# Patient Record
Sex: FEMALE | Race: WHITE | NOT HISPANIC OR LATINO | Employment: FULL TIME | URBAN - METROPOLITAN AREA
[De-identification: names, ages, dates, MRNs, and addresses within clinical notes are randomized per-mention and may not be internally consistent; named-entity substitution may affect disease eponyms.]

---

## 2023-03-15 LAB
THYROTROPIN (MIU/L) IN SER/PLAS BY DETECTION LIMIT <= 0.05 MIU/L: 4.66 MIU/L (ref 0.44–3.98)
THYROXINE (T4) FREE (NG/DL) IN SER/PLAS: 0.83 NG/DL (ref 0.78–1.48)
TRIIODOTHYRONINE (T3) FREE (PG/ML) IN SER/PLAS: 3.5 PG/ML (ref 2.3–4.2)

## 2023-07-18 LAB
THYROTROPIN (MIU/L) IN SER/PLAS BY DETECTION LIMIT <= 0.05 MIU/L: 0.03 MIU/L (ref 0.44–3.98)
THYROXINE (T4) FREE (NG/DL) IN SER/PLAS: 2.01 NG/DL (ref 0.78–1.48)
TRIIODOTHYRONINE (T3) FREE (PG/ML) IN SER/PLAS: 4.2 PG/ML (ref 2.3–4.2)

## 2023-10-03 ENCOUNTER — LAB (OUTPATIENT)
Dept: LAB | Facility: LAB | Age: 63
End: 2023-10-03
Payer: COMMERCIAL

## 2023-10-03 DIAGNOSIS — E03.9 HYPOTHYROIDISM, UNSPECIFIED: ICD-10-CM

## 2023-10-03 DIAGNOSIS — E03.9 HYPOTHYROIDISM, UNSPECIFIED TYPE: Primary | ICD-10-CM

## 2023-10-03 DIAGNOSIS — E03.9 HYPOTHYROIDISM, UNSPECIFIED: Primary | ICD-10-CM

## 2023-10-03 LAB — TSH SERPL-ACNC: 0.24 MIU/L (ref 0.44–3.98)

## 2023-10-03 PROCEDURE — 36415 COLL VENOUS BLD VENIPUNCTURE: CPT

## 2023-10-03 RX ORDER — LEVOTHYROXINE SODIUM 137 UG/1
137 TABLET ORAL DAILY
Qty: 90 TABLET | Refills: 3 | Status: SHIPPED | OUTPATIENT
Start: 2023-10-03 | End: 2024-10-02

## 2023-10-04 LAB — T4 FREE SERPL-MCNC: 1.69 NG/DL (ref 0.78–1.48)

## 2023-11-20 ENCOUNTER — TELEPHONE (OUTPATIENT)
Dept: OBSTETRICS AND GYNECOLOGY | Facility: CLINIC | Age: 63
End: 2023-11-20
Payer: COMMERCIAL

## 2023-11-20 DIAGNOSIS — N95.2 VAGINAL ATROPHY: Primary | ICD-10-CM

## 2023-11-20 RX ORDER — PRASTERONE 6.5 MG/1
6.5 INSERT VAGINAL NIGHTLY
Qty: 28 EACH | Refills: 11 | Status: SHIPPED | OUTPATIENT
Start: 2023-11-20 | End: 2024-03-22 | Stop reason: SDUPTHER

## 2023-11-20 NOTE — TELEPHONE ENCOUNTER
Left message for pt to return call.  Rx refill sent to [facility].  Pt due for annual last appt was 11/2022.

## 2023-12-20 ENCOUNTER — APPOINTMENT (OUTPATIENT)
Dept: PRIMARY CARE | Facility: CLINIC | Age: 63
End: 2023-12-20
Payer: COMMERCIAL

## 2024-01-16 ENCOUNTER — ANCILLARY PROCEDURE (OUTPATIENT)
Dept: RADIOLOGY | Facility: CLINIC | Age: 64
End: 2024-01-16
Payer: COMMERCIAL

## 2024-01-16 ENCOUNTER — OFFICE VISIT (OUTPATIENT)
Dept: PRIMARY CARE | Facility: CLINIC | Age: 64
End: 2024-01-16
Payer: COMMERCIAL

## 2024-01-16 ENCOUNTER — LAB (OUTPATIENT)
Dept: LAB | Facility: LAB | Age: 64
End: 2024-01-16
Payer: COMMERCIAL

## 2024-01-16 VITALS
WEIGHT: 154.6 LBS | HEIGHT: 59 IN | SYSTOLIC BLOOD PRESSURE: 132 MMHG | HEART RATE: 77 BPM | BODY MASS INDEX: 31.17 KG/M2 | DIASTOLIC BLOOD PRESSURE: 67 MMHG

## 2024-01-16 DIAGNOSIS — Z12.31 ENCOUNTER FOR SCREENING MAMMOGRAM FOR MALIGNANT NEOPLASM OF BREAST: ICD-10-CM

## 2024-01-16 DIAGNOSIS — Z23 NEED FOR PNEUMOCOCCAL 20-VALENT CONJUGATE VACCINATION: ICD-10-CM

## 2024-01-16 DIAGNOSIS — Z00.00 ROUTINE GENERAL MEDICAL EXAMINATION AT A HEALTH CARE FACILITY: Primary | ICD-10-CM

## 2024-01-16 DIAGNOSIS — M53.3 SI (SACROILIAC) JOINT DYSFUNCTION: ICD-10-CM

## 2024-01-16 DIAGNOSIS — M75.21 BICEPS TENDINITIS OF RIGHT UPPER EXTREMITY: ICD-10-CM

## 2024-01-16 DIAGNOSIS — Z00.00 ROUTINE GENERAL MEDICAL EXAMINATION AT A HEALTH CARE FACILITY: ICD-10-CM

## 2024-01-16 DIAGNOSIS — E03.9 HYPOTHYROIDISM, UNSPECIFIED TYPE: ICD-10-CM

## 2024-01-16 DIAGNOSIS — Z78.0 POSTMENOPAUSAL ESTROGEN DEFICIENCY: ICD-10-CM

## 2024-01-16 DIAGNOSIS — Z23 NEED FOR INFLUENZA VACCINATION: ICD-10-CM

## 2024-01-16 PROBLEM — H25.13 NUCLEAR SCLEROSIS OF BOTH EYES: Status: ACTIVE | Noted: 2024-01-16

## 2024-01-16 PROBLEM — K58.9 IRRITABLE BOWEL SYNDROME: Status: ACTIVE | Noted: 2024-01-16

## 2024-01-16 PROBLEM — M15.9 OSTEOARTHRITIS, MULTIPLE SITES: Status: ACTIVE | Noted: 2024-01-16

## 2024-01-16 PROBLEM — M81.0 AGE RELATED OSTEOPOROSIS: Status: ACTIVE | Noted: 2024-01-16

## 2024-01-16 PROBLEM — G43.109 OPHTHALMIC MIGRAINE: Status: ACTIVE | Noted: 2024-01-16

## 2024-01-16 PROBLEM — R76.0 ABNORMAL ANTIBODY TITER: Status: ACTIVE | Noted: 2024-01-16

## 2024-01-16 PROBLEM — M80.00XA OSTEOPOROSIS WITH CURRENT PATHOLOGICAL FRACTURE: Status: RESOLVED | Noted: 2024-01-16 | Resolved: 2024-01-16

## 2024-01-16 PROBLEM — H52.4 BILATERAL PRESBYOPIA: Status: ACTIVE | Noted: 2024-01-16

## 2024-01-16 PROBLEM — H26.9 CATARACTS, BOTH EYES: Status: ACTIVE | Noted: 2024-01-16

## 2024-01-16 PROBLEM — H52.13 BILATERAL MYOPIA: Status: ACTIVE | Noted: 2024-01-16

## 2024-01-16 PROBLEM — H43.813 PVD (POSTERIOR VITREOUS DETACHMENT), BOTH EYES: Status: ACTIVE | Noted: 2024-01-16

## 2024-01-16 PROBLEM — E55.9 VITAMIN D DEFICIENCY: Status: ACTIVE | Noted: 2024-01-16

## 2024-01-16 PROBLEM — E66.9 OBESITY: Status: ACTIVE | Noted: 2024-01-16

## 2024-01-16 PROBLEM — H52.7 REFRACTIVE ERROR: Status: ACTIVE | Noted: 2024-01-16

## 2024-01-16 LAB
25(OH)D3 SERPL-MCNC: 65 NG/ML (ref 30–100)
ALBUMIN SERPL BCP-MCNC: 4.2 G/DL (ref 3.4–5)
ALP SERPL-CCNC: 106 U/L (ref 33–136)
ALT SERPL W P-5'-P-CCNC: 12 U/L (ref 7–45)
ANION GAP SERPL CALC-SCNC: 14 MMOL/L (ref 10–20)
AST SERPL W P-5'-P-CCNC: 14 U/L (ref 9–39)
BASOPHILS # BLD AUTO: 0.06 X10*3/UL (ref 0–0.1)
BASOPHILS NFR BLD AUTO: 0.7 %
BILIRUB SERPL-MCNC: 0.4 MG/DL (ref 0–1.2)
BUN SERPL-MCNC: 19 MG/DL (ref 6–23)
CALCIUM SERPL-MCNC: 9.9 MG/DL (ref 8.6–10.6)
CHLORIDE SERPL-SCNC: 107 MMOL/L (ref 98–107)
CHOLEST SERPL-MCNC: 162 MG/DL (ref 0–199)
CHOLESTEROL/HDL RATIO: 2.8
CK SERPL-CCNC: 51 U/L (ref 0–215)
CO2 SERPL-SCNC: 23 MMOL/L (ref 21–32)
CREAT SERPL-MCNC: 0.63 MG/DL (ref 0.5–1.05)
CREAT UR-MCNC: 88.6 MG/DL (ref 20–320)
EGFRCR SERPLBLD CKD-EPI 2021: >90 ML/MIN/1.73M*2
EOSINOPHIL # BLD AUTO: 0.09 X10*3/UL (ref 0–0.7)
EOSINOPHIL NFR BLD AUTO: 1.1 %
ERYTHROCYTE [DISTWIDTH] IN BLOOD BY AUTOMATED COUNT: 13.1 % (ref 11.5–14.5)
ERYTHROCYTE [SEDIMENTATION RATE] IN BLOOD BY WESTERGREN METHOD: 8 MM/H (ref 0–30)
EST. AVERAGE GLUCOSE BLD GHB EST-MCNC: 114 MG/DL
GLUCOSE SERPL-MCNC: 97 MG/DL (ref 74–99)
HBA1C MFR BLD: 5.6 %
HCT VFR BLD AUTO: 41.9 % (ref 36–46)
HDLC SERPL-MCNC: 58.2 MG/DL
HGB BLD-MCNC: 13.8 G/DL (ref 12–16)
IMM GRANULOCYTES # BLD AUTO: 0.01 X10*3/UL (ref 0–0.7)
IMM GRANULOCYTES NFR BLD AUTO: 0.1 % (ref 0–0.9)
IRON SATN MFR SERPL: 40 % (ref 25–45)
IRON SERPL-MCNC: 151 UG/DL (ref 35–150)
LDLC SERPL CALC-MCNC: 90 MG/DL
LYMPHOCYTES # BLD AUTO: 2.71 X10*3/UL (ref 1.2–4.8)
LYMPHOCYTES NFR BLD AUTO: 32 %
MCH RBC QN AUTO: 28.7 PG (ref 26–34)
MCHC RBC AUTO-ENTMCNC: 32.9 G/DL (ref 32–36)
MCV RBC AUTO: 87 FL (ref 80–100)
MICROALBUMIN UR-MCNC: 26.3 MG/L
MICROALBUMIN/CREAT UR: 29.7 UG/MG CREAT
MONOCYTES # BLD AUTO: 0.39 X10*3/UL (ref 0.1–1)
MONOCYTES NFR BLD AUTO: 4.6 %
NEUTROPHILS # BLD AUTO: 5.21 X10*3/UL (ref 1.2–7.7)
NEUTROPHILS NFR BLD AUTO: 61.5 %
NON HDL CHOLESTEROL: 104 MG/DL (ref 0–149)
NRBC BLD-RTO: 0 /100 WBCS (ref 0–0)
PLATELET # BLD AUTO: 277 X10*3/UL (ref 150–450)
POTASSIUM SERPL-SCNC: 4.2 MMOL/L (ref 3.5–5.3)
PROT SERPL-MCNC: 6.8 G/DL (ref 6.4–8.2)
RBC # BLD AUTO: 4.81 X10*6/UL (ref 4–5.2)
SODIUM SERPL-SCNC: 140 MMOL/L (ref 136–145)
TIBC SERPL-MCNC: 378 UG/DL (ref 240–445)
TRIGL SERPL-MCNC: 69 MG/DL (ref 0–149)
TSH SERPL-ACNC: 0.58 MIU/L (ref 0.44–3.98)
UIBC SERPL-MCNC: 227 UG/DL (ref 110–370)
URATE SERPL-MCNC: 4.5 MG/DL (ref 2.3–6.7)
VIT B12 SERPL-MCNC: 339 PG/ML (ref 211–911)
VLDL: 14 MG/DL (ref 0–40)
WBC # BLD AUTO: 8.5 X10*3/UL (ref 4.4–11.3)

## 2024-01-16 PROCEDURE — 82570 ASSAY OF URINE CREATININE: CPT

## 2024-01-16 PROCEDURE — 1126F AMNT PAIN NOTED NONE PRSNT: CPT | Performed by: INTERNAL MEDICINE

## 2024-01-16 PROCEDURE — 73502 X-RAY EXAM HIP UNI 2-3 VIEWS: CPT | Mod: LEFT SIDE | Performed by: RADIOLOGY

## 2024-01-16 PROCEDURE — 1036F TOBACCO NON-USER: CPT | Performed by: INTERNAL MEDICINE

## 2024-01-16 PROCEDURE — 84443 ASSAY THYROID STIM HORMONE: CPT

## 2024-01-16 PROCEDURE — 90472 IMMUNIZATION ADMIN EACH ADD: CPT | Performed by: INTERNAL MEDICINE

## 2024-01-16 PROCEDURE — 80061 LIPID PANEL: CPT

## 2024-01-16 PROCEDURE — 90677 PCV20 VACCINE IM: CPT | Performed by: INTERNAL MEDICINE

## 2024-01-16 PROCEDURE — 73030 X-RAY EXAM OF SHOULDER: CPT | Mod: RIGHT SIDE | Performed by: RADIOLOGY

## 2024-01-16 PROCEDURE — 82607 VITAMIN B-12: CPT

## 2024-01-16 PROCEDURE — 83540 ASSAY OF IRON: CPT

## 2024-01-16 PROCEDURE — 85652 RBC SED RATE AUTOMATED: CPT

## 2024-01-16 PROCEDURE — 84550 ASSAY OF BLOOD/URIC ACID: CPT

## 2024-01-16 PROCEDURE — 73502 X-RAY EXAM HIP UNI 2-3 VIEWS: CPT | Mod: LT

## 2024-01-16 PROCEDURE — 36415 COLL VENOUS BLD VENIPUNCTURE: CPT

## 2024-01-16 PROCEDURE — 90662 IIV NO PRSV INCREASED AG IM: CPT | Performed by: INTERNAL MEDICINE

## 2024-01-16 PROCEDURE — 85025 COMPLETE CBC W/AUTO DIFF WBC: CPT

## 2024-01-16 PROCEDURE — 93000 ELECTROCARDIOGRAM COMPLETE: CPT | Performed by: INTERNAL MEDICINE

## 2024-01-16 PROCEDURE — 83550 IRON BINDING TEST: CPT

## 2024-01-16 PROCEDURE — 82306 VITAMIN D 25 HYDROXY: CPT

## 2024-01-16 PROCEDURE — 90471 IMMUNIZATION ADMIN: CPT | Performed by: INTERNAL MEDICINE

## 2024-01-16 PROCEDURE — 82550 ASSAY OF CK (CPK): CPT

## 2024-01-16 PROCEDURE — 82043 UR ALBUMIN QUANTITATIVE: CPT

## 2024-01-16 PROCEDURE — 99397 PER PM REEVAL EST PAT 65+ YR: CPT | Performed by: INTERNAL MEDICINE

## 2024-01-16 PROCEDURE — 83036 HEMOGLOBIN GLYCOSYLATED A1C: CPT

## 2024-01-16 PROCEDURE — 80053 COMPREHEN METABOLIC PANEL: CPT

## 2024-01-16 PROCEDURE — 1159F MED LIST DOCD IN RCRD: CPT | Performed by: INTERNAL MEDICINE

## 2024-01-16 PROCEDURE — 73030 X-RAY EXAM OF SHOULDER: CPT | Mod: RT

## 2024-01-16 RX ORDER — ACETAMINOPHEN 500 MG
1 TABLET ORAL DAILY
COMMUNITY

## 2024-01-16 RX ORDER — MECLIZINE HYDROCHLORIDE 25 MG/1
1 TABLET ORAL 3 TIMES DAILY PRN
COMMUNITY
Start: 2022-06-16

## 2024-01-16 RX ORDER — LYSINE HCL 500 MG
TABLET ORAL
COMMUNITY
Start: 2014-08-18

## 2024-01-16 ASSESSMENT — ENCOUNTER SYMPTOMS
FEVER: 0
MYALGIAS: 1
EYE DISCHARGE: 0
HEMATURIA: 0
WHEEZING: 0
PALPITATIONS: 0
FREQUENCY: 0
ADENOPATHY: 0
NECK PAIN: 0
BRUISES/BLEEDS EASILY: 0
FATIGUE: 0
SINUS PAIN: 0
NAUSEA: 0
EYE ITCHING: 0
WEAKNESS: 0
HYPERACTIVE: 0
DIFFICULTY URINATING: 0
ABDOMINAL DISTENTION: 0
ABDOMINAL PAIN: 0
DYSPHORIC MOOD: 0
SORE THROAT: 0
SHORTNESS OF BREATH: 0
CONSTIPATION: 0
RHINORRHEA: 0
DIZZINESS: 0
VOICE CHANGE: 0
SINUS PRESSURE: 0
NECK STIFFNESS: 0
HEADACHES: 0
CHEST TIGHTNESS: 0
SLEEP DISTURBANCE: 0
APNEA: 0
COLOR CHANGE: 0
NERVOUS/ANXIOUS: 0
VOMITING: 0
DYSURIA: 0
CHILLS: 0
COUGH: 0
NUMBNESS: 0
LIGHT-HEADEDNESS: 0
ACTIVITY CHANGE: 0
DIARRHEA: 0
ARTHRALGIAS: 1
BACK PAIN: 0
DECREASED CONCENTRATION: 0

## 2024-01-16 NOTE — PATIENT INSTRUCTIONS
We will follow up on all comprehensive blood work once results are available and make any recommendations based on these results as may be indicated.  Please continue with routine gynecologic exams and annual mammograms.  Please consider also scheduling your bone density scan.  Colonoscopy will be due next year.  You have received your annual flu shot as well as your Prevnar 20 pneumonia vaccine.  Please consider a shingles vaccine series, updated tetanus booster, RSV vaccine and COVID boosters.  We will proceed with x-rays of the right shoulder and the left hip/pelvis.  I would recommend over-the-counter Aleve twice daily with food as well as stretching exercises.  I would ice the shoulder as well as alternate heat and ice to the lower back.  If you have persistent symptoms, we could consider physical therapy.  Please keep us updated on how you are responding to conservative treatment, please reach out with any questions or concerns.  Otherwise, we are happy to see you annually for your wellness visits.

## 2024-01-16 NOTE — PROGRESS NOTES
Thais Huff comes in today for a comprehensive physical exam.      Ms. Huff comes in today for a comprehensive physical exam.  She is feeling reasonably well.  She has had some generalized aching, most prominent in her right arm/shoulder as well as her left leg.  She has not had any specific injuries, uncertain whether this could be related to arthritis or soft tissue.  She finds that her right shoulder bothers her when she tries to reach overhead.  Left leg is achy from her thigh all the way down to her lower leg, no swelling.  There is no localized hip pain or lower back pain that she notices.  This is most prominent when she gets up from a seated position but not necessarily through the day or night.  She follows with her endocrinologist, continuing to make dose adjustments in her thyroid with upcoming labs and appointment.  She is comfortable having fasting blood work done today.  She denies any headaches, dizziness, chest pain or palpitations, shortness of breath nor cough, nausea, vomiting, nor acute change in bowel or bladder habits.  She keeps up with fiber supplements.  She follows with gynecology regularly.        Review of Systems   Constitutional:  Negative for activity change, chills, fatigue and fever.   HENT:  Positive for congestion. Negative for ear discharge, ear pain, hearing loss, postnasal drip, rhinorrhea, sinus pressure, sinus pain, sneezing, sore throat, tinnitus and voice change.    Eyes:  Negative for discharge, itching and visual disturbance.   Respiratory:  Negative for apnea, cough, chest tightness, shortness of breath and wheezing.    Cardiovascular:  Negative for chest pain, palpitations and leg swelling.   Gastrointestinal:  Negative for abdominal distention, abdominal pain, constipation, diarrhea, nausea and vomiting.   Endocrine: Negative for cold intolerance, heat intolerance and polyuria.   Genitourinary:  Negative for difficulty urinating, dysuria, frequency, hematuria,  pelvic pain, urgency, vaginal bleeding and vaginal discharge.   Musculoskeletal:  Positive for arthralgias and myalgias. Negative for back pain, gait problem, neck pain and neck stiffness.   Skin:  Negative for color change, pallor and rash.   Allergic/Immunologic: Negative for environmental allergies, food allergies and immunocompromised state.   Neurological:  Negative for dizziness, syncope, weakness, light-headedness, numbness and headaches.   Hematological:  Negative for adenopathy. Does not bruise/bleed easily.   Psychiatric/Behavioral:  Negative for behavioral problems, decreased concentration, dysphoric mood and sleep disturbance. The patient is not nervous/anxious and is not hyperactive.        Objective   Physical Exam  Constitutional:       General: She is not in acute distress.     Appearance: Normal appearance. She is well-developed. She is not ill-appearing.   HENT:      Head: Normocephalic.      Right Ear: Tympanic membrane, ear canal and external ear normal.      Left Ear: Tympanic membrane, ear canal and external ear normal.      Nose: Nose normal. No congestion.      Mouth/Throat:      Mouth: Mucous membranes are moist.      Pharynx: Oropharynx is clear. No oropharyngeal exudate or posterior oropharyngeal erythema.   Eyes:      General: Lids are normal. No scleral icterus.     Extraocular Movements: Extraocular movements intact.      Conjunctiva/sclera: Conjunctivae normal.      Pupils: Pupils are equal, round, and reactive to light.   Neck:      Vascular: No carotid bruit.   Cardiovascular:      Rate and Rhythm: Normal rate and regular rhythm.      Pulses: Normal pulses.      Heart sounds: No murmur heard.     No gallop.   Pulmonary:      Effort: Pulmonary effort is normal. No respiratory distress.      Breath sounds: No wheezing, rhonchi or rales.   Chest:      Comments: Deferred to gynecology  Abdominal:      General: Bowel sounds are normal. There is no distension.      Palpations: Abdomen is  soft. There is no mass.      Tenderness: There is no abdominal tenderness. There is no guarding or rebound.   Genitourinary:     Comments: Deferred to gynecology  Musculoskeletal:         General: Tenderness present. No swelling or signs of injury.      Cervical back: Normal range of motion and neck supple. No tenderness.      Right lower leg: No edema.      Left lower leg: No edema.      Comments: Some reproducible pain on palpation over the bicep tendon insertion with abduction triggering discomfort.  Left-sided SI joint spasm, straight leg raise negative.  Reflexes symmetrical.  No reproducible pain on palpation over the hip joint.  Range of motion full.   Lymphadenopathy:      Cervical: No cervical adenopathy.   Skin:     General: Skin is warm and dry.      Coloration: Skin is not pale.      Findings: No bruising or rash.   Neurological:      General: No focal deficit present.      Mental Status: She is alert and oriented to person, place, and time.      Cranial Nerves: No cranial nerve deficit.      Motor: No weakness.      Gait: Gait normal.      Deep Tendon Reflexes: Reflexes normal.   Psychiatric:         Mood and Affect: Mood normal.         Behavior: Behavior normal.         Judgment: Judgment normal.         Assessment/Plan   Full age-appropriate comprehensive physical exam and health care maintenance performed and discussed today.  Routine safety and preventative measures discussed including self breast exam, seatbelt use, no drinking and driving, no texting and driving, abstinence or cessation of tobacco use, routine dental and vision exams, healthy diet and regular exercise.    We will update comprehensive labs and follow-up on results once available.  Due for mammogram and DEXA.  Both requisitions placed.  Colonoscopy remains up-to-date from 2015, recommended to be repeated in 10 years total, hence due next year.  EKG normal as above.  Annual flu shot and Prevnar 20 provided today.  Declines COVID  boosters.  Have also counseled regarding shingles vaccine, RSV vaccine, and tetanus vaccine recommendations.    We will proceed with x-rays of the right shoulder and left hip/pelvis, but I suspect that this is more SI joint related as well as possible bicep tendinitis.  Have recommended heat alternating with ice to the SI joint, ice to the shoulder girdle.  Have provided stretching exercises and have recommended NSAIDs.  If symptoms persist, could consider physical therapy.    If all remains well otherwise, we are happy to see her back annually for her wellness visits.  She should contact us with any additional questions or concerns.  Problem List Items Addressed This Visit    None

## 2024-01-25 ENCOUNTER — APPOINTMENT (OUTPATIENT)
Dept: ENDOCRINOLOGY | Facility: CLINIC | Age: 64
End: 2024-01-25
Payer: COMMERCIAL

## 2024-03-14 ENCOUNTER — HOSPITAL ENCOUNTER (OUTPATIENT)
Dept: RADIOLOGY | Facility: CLINIC | Age: 64
Discharge: HOME | End: 2024-03-14
Payer: COMMERCIAL

## 2024-03-14 VITALS — HEIGHT: 59 IN | BODY MASS INDEX: 30.24 KG/M2 | WEIGHT: 150 LBS

## 2024-03-14 DIAGNOSIS — Z78.0 POSTMENOPAUSAL ESTROGEN DEFICIENCY: ICD-10-CM

## 2024-03-14 DIAGNOSIS — Z12.31 ENCOUNTER FOR SCREENING MAMMOGRAM FOR MALIGNANT NEOPLASM OF BREAST: ICD-10-CM

## 2024-03-14 PROCEDURE — 77067 SCR MAMMO BI INCL CAD: CPT | Performed by: RADIOLOGY

## 2024-03-14 PROCEDURE — 77063 BREAST TOMOSYNTHESIS BI: CPT | Performed by: RADIOLOGY

## 2024-03-14 PROCEDURE — 77067 SCR MAMMO BI INCL CAD: CPT

## 2024-03-14 PROCEDURE — 77080 DXA BONE DENSITY AXIAL: CPT

## 2024-03-22 ENCOUNTER — OFFICE VISIT (OUTPATIENT)
Dept: OBSTETRICS AND GYNECOLOGY | Facility: CLINIC | Age: 64
End: 2024-03-22
Payer: COMMERCIAL

## 2024-03-22 VITALS
DIASTOLIC BLOOD PRESSURE: 80 MMHG | HEIGHT: 59 IN | WEIGHT: 155.2 LBS | SYSTOLIC BLOOD PRESSURE: 130 MMHG | BODY MASS INDEX: 31.29 KG/M2

## 2024-03-22 DIAGNOSIS — Z01.419 WELL WOMAN EXAM WITH ROUTINE GYNECOLOGICAL EXAM: Primary | ICD-10-CM

## 2024-03-22 DIAGNOSIS — N95.2 VAGINAL ATROPHY: ICD-10-CM

## 2024-03-22 PROCEDURE — 99397 PER PM REEVAL EST PAT 65+ YR: CPT | Performed by: NURSE PRACTITIONER

## 2024-03-22 PROCEDURE — 1126F AMNT PAIN NOTED NONE PRSNT: CPT | Performed by: NURSE PRACTITIONER

## 2024-03-22 PROCEDURE — 1160F RVW MEDS BY RX/DR IN RCRD: CPT | Performed by: NURSE PRACTITIONER

## 2024-03-22 PROCEDURE — 1159F MED LIST DOCD IN RCRD: CPT | Performed by: NURSE PRACTITIONER

## 2024-03-22 PROCEDURE — 1036F TOBACCO NON-USER: CPT | Performed by: NURSE PRACTITIONER

## 2024-03-22 RX ORDER — PRASTERONE 6.5 MG/1
6.5 INSERT VAGINAL NIGHTLY
Qty: 28 EACH | Refills: 11 | Status: SHIPPED | OUTPATIENT
Start: 2024-03-22

## 2024-03-22 ASSESSMENT — ENCOUNTER SYMPTOMS
CONSTITUTIONAL NEGATIVE: 0
HEMATOLOGIC/LYMPHATIC NEGATIVE: 0
CARDIOVASCULAR NEGATIVE: 0
RESPIRATORY NEGATIVE: 0
EYES NEGATIVE: 0
PSYCHIATRIC NEGATIVE: 0
MUSCULOSKELETAL NEGATIVE: 0
ENDOCRINE NEGATIVE: 0
NEUROLOGICAL NEGATIVE: 0
ALLERGIC/IMMUNOLOGIC NEGATIVE: 0
GASTROINTESTINAL NEGATIVE: 0

## 2024-03-22 ASSESSMENT — PAIN SCALES - GENERAL: PAINLEVEL: 0-NO PAIN

## 2024-03-22 NOTE — PROGRESS NOTES
"Subjective   Patient ID: Thais Huff is a 63 y.o. female who presents for Annual Exam.  HPI  Pt presents for annual exam  Patient has history of vulva punch bx on 11/8/22. Results showed mild hyperkeratosis with mild acanthosis. LS was not present.     Concerns: none  Uses intrarosa several times a week. States that she has noticed improvement with pain with intercourse.  Also states she has tried different vaginal moisturizers to help with her vaginal dryness. States she has noticed improvement.     Age at Menopause: \"50s\"    Postmenopausal bleeding: none  Mood changes: no  Sleep problems: no  VMS: sometimes  Joint pain: yes  Brain fog/difficulty concentrating: \"a little bit\"  GSM: yes  are you sexually active?: yes  Having sex with men, women, or both/other: monogamous relationship with   do you have any loss in desire?: yes  do you have any pain with intercourse?: yes  are you able to have an orgasm?: no  do you have any persistent genital arousal?: no  Vaginal hygiene: soap and water  Urinary incontinence: no    H/O of STI: no  requesting sti testing?: no  H/O abnormal pap: yes- 20+ years ago    Lives with:  and 3 children   Employment:   exercise: yes-walks      FH of breast cancer: yes- sister and maternal grandmother  FH of ovarian cancer: no  FH of colon cancer: no  FH pancreatic cancer:  no    Bone marrow density and mammogram UTD  Review of Systems    Objective   Physical Exam  Vitals and nursing note reviewed.   Constitutional:       Appearance: Normal appearance.   Neck:      Thyroid: No thyroid mass.   Cardiovascular:      Rate and Rhythm: Normal rate and regular rhythm.      Heart sounds: Normal heart sounds.   Pulmonary:      Effort: Pulmonary effort is normal.      Breath sounds: Normal breath sounds.   Chest:   Breasts:     Right: Normal.      Left: Normal.   Abdominal:      Tenderness: There is no abdominal tenderness.   Genitourinary:     Exam position: Lithotomy " position.      Labia:         Right: No lesion.         Left: No lesion.       Comments: GSM noted on exam  Skin:     General: Skin is warm and dry.   Neurological:      Mental Status: She is alert.   Psychiatric:         Attention and Perception: Attention normal.         Mood and Affect: Mood normal.         Speech: Speech normal.         Behavior: Behavior normal.         Thought Content: Thought content normal.         Cognition and Memory: Cognition and memory normal.         Judgment: Judgment normal.         Assessment/Plan   Diagnoses and all orders for this visit:  Well woman exam with routine gynecological exam  Vaginal atrophy  -     prasterone, dhea, (Intrarosa) 6.5 mg insert; Insert 6.5 mg into the vagina once daily at bedtime.    -Patient to continue to use intrarosa. New prescription sent.   -Sample of Via given to patient today. Asked patient to let me know how she likes it.  -Patient asked to reach out if any questions/concerns arise.  -Will follow up in 1 year.       ADONIS PHAN 03/22/24 1:23 PM

## 2024-03-22 NOTE — PROGRESS NOTES
Subjective   Patient ID: Thais Huff is a 63 y.o. female who presents for Annual Exam.  HPI  Pt presents for annual exam  Concerns:     Age at Menopause:   Perimenopausal:   Menses:   Contraception:      Any Contraindications to HT: personal h/o breast cancer, estrogen sensitive cancer, dementia, stroke, MI, VTE or inherited high risk for VTE, SCAD:   h/o congenital heart disease (high risk of DVT)     History of HT:   History of OTC treatments for menopausal symptoms:   History of prescription, non-hormonal medications for menopausal symptoms:   Postmenopausal bleeding:   Mood changes:   Sleep problems:   VMS:   Joint pain:   Brain fog/difficulty concentrating:   GSM:   are you sexually active?:   Having sex with men, women, or both/other:   do you have any loss in desire?:   do you have any pain with intercourse?:   are you able to have an orgasm?:   do you have any persistent genital arousal?:   Vaginal hygiene:   Urinary incontinence:     H/O pregnancy:   H/O of STI:   requesting sti testing?:   H/O abnormal pap:     Lives with:   Employment:   exercise:   h/o trauma:     FH of breast cancer:   FH of ovarian cancer:   FH of colon cancer:   FH pancreatic cancer:    Review of Systems    Objective   Physical Exam    Assessment/Plan   {Assess/PlanSmartLinks:05901}         JASS Domínguez-CNP 03/22/24 1:19 PM

## 2024-08-08 ENCOUNTER — APPOINTMENT (OUTPATIENT)
Dept: OPHTHALMOLOGY | Facility: CLINIC | Age: 64
End: 2024-08-08
Payer: COMMERCIAL

## 2024-08-08 DIAGNOSIS — H52.13 BILATERAL MYOPIA: Primary | ICD-10-CM

## 2024-08-08 DIAGNOSIS — H52.4 BILATERAL PRESBYOPIA: ICD-10-CM

## 2024-08-08 DIAGNOSIS — H25.813 COMBINED FORMS OF AGE-RELATED CATARACT OF BOTH EYES: ICD-10-CM

## 2024-08-08 DIAGNOSIS — H43.813 PVD (POSTERIOR VITREOUS DETACHMENT), BOTH EYES: ICD-10-CM

## 2024-08-08 PROCEDURE — 92015 DETERMINE REFRACTIVE STATE: CPT | Performed by: OPTOMETRIST

## 2024-08-08 PROCEDURE — 92014 COMPRE OPH EXAM EST PT 1/>: CPT | Performed by: OPTOMETRIST

## 2024-08-08 ASSESSMENT — REFRACTION_MANIFEST
METHOD_AUTOREFRACTION: 1
OS_SPHERE: -0.75
OD_AXIS: 115
OD_ADD: +2.50
OD_AXIS: 107
OS_CYLINDER: -1.25
OS_AXIS: 065
OS_AXIS: 065
OD_SPHERE: -0.75
OS_CYLINDER: -1.00
OS_SPHERE: -0.25
OD_CYLINDER: -1.75
OD_CYLINDER: -1.75
OD_SPHERE: -0.75
OS_ADD: +2.50

## 2024-08-08 ASSESSMENT — REFRACTION_WEARINGRX
OS_CYLINDER: -1.50
OS_SPHERE: -0.75
OS_AXIS: 065
OD_SPHERE: -1.00
OD_AXIS: 115
OS_ADD: +2.50
OD_ADD: +2.50
OD_CYLINDER: -1.75

## 2024-08-08 ASSESSMENT — EXTERNAL EXAM - LEFT EYE: OS_EXAM: NORMAL

## 2024-08-08 ASSESSMENT — TONOMETRY
IOP_METHOD: GOLDMANN APPLANATION
OD_IOP_MMHG: 12
OS_IOP_MMHG: 12

## 2024-08-08 ASSESSMENT — CONF VISUAL FIELD
OS_NORMAL: 1
OS_SUPERIOR_NASAL_RESTRICTION: 0
OD_SUPERIOR_NASAL_RESTRICTION: 0
OS_INFERIOR_NASAL_RESTRICTION: 0
OD_INFERIOR_NASAL_RESTRICTION: 0
OD_SUPERIOR_TEMPORAL_RESTRICTION: 0
OS_INFERIOR_TEMPORAL_RESTRICTION: 0
OD_NORMAL: 1
OS_SUPERIOR_TEMPORAL_RESTRICTION: 0
OD_INFERIOR_TEMPORAL_RESTRICTION: 0

## 2024-08-08 ASSESSMENT — SLIT LAMP EXAM - LIDS
COMMENTS: GOOD POSITION
COMMENTS: GOOD POSITION

## 2024-08-08 ASSESSMENT — VISUAL ACUITY
OS_CC: 20/20
OD_CC: 20/20
CORRECTION_TYPE: GLASSES
METHOD: SNELLEN - LINEAR
OD_CC+: -1

## 2024-08-08 ASSESSMENT — ENCOUNTER SYMPTOMS: EYES NEGATIVE: 1

## 2024-08-08 ASSESSMENT — CUP TO DISC RATIO
OS_RATIO: 0.5
OD_RATIO: 0.5

## 2024-08-08 ASSESSMENT — EXTERNAL EXAM - RIGHT EYE: OD_EXAM: NORMAL

## 2024-08-08 NOTE — PROGRESS NOTES
NS and will monitgor. PVD OU and The patient was asked to return to our clinic or seek out eye care ASAP if new flashes of light or floaters are noted.     Pt has ocular migraines and reviewed.     A spectacle prescription was given at the patient`s request. The patient was asked to return to our clinic in one year or sooner if ocular or vision changes occur

## 2024-09-30 DIAGNOSIS — E03.9 HYPOTHYROIDISM, UNSPECIFIED TYPE: ICD-10-CM

## 2024-09-30 RX ORDER — LEVOTHYROXINE SODIUM 137 UG/1
137 TABLET ORAL DAILY
Qty: 90 TABLET | Refills: 0 | Status: SHIPPED | OUTPATIENT
Start: 2024-09-30 | End: 2025-09-30

## 2024-10-15 ENCOUNTER — LAB (OUTPATIENT)
Dept: LAB | Facility: LAB | Age: 64
End: 2024-10-15
Payer: COMMERCIAL

## 2024-10-15 DIAGNOSIS — E03.9 HYPOTHYROIDISM, UNSPECIFIED TYPE: ICD-10-CM

## 2024-10-15 LAB — TSH SERPL-ACNC: 0.57 MIU/L (ref 0.44–3.98)

## 2024-10-15 PROCEDURE — 36415 COLL VENOUS BLD VENIPUNCTURE: CPT

## 2024-10-15 PROCEDURE — 84443 ASSAY THYROID STIM HORMONE: CPT

## 2025-01-16 ENCOUNTER — LAB (OUTPATIENT)
Dept: LAB | Facility: LAB | Age: 65
End: 2025-01-16
Payer: COMMERCIAL

## 2025-01-16 ENCOUNTER — APPOINTMENT (OUTPATIENT)
Dept: PRIMARY CARE | Facility: CLINIC | Age: 65
End: 2025-01-16
Payer: COMMERCIAL

## 2025-01-16 VITALS
RESPIRATION RATE: 14 BRPM | DIASTOLIC BLOOD PRESSURE: 78 MMHG | WEIGHT: 152 LBS | SYSTOLIC BLOOD PRESSURE: 118 MMHG | HEART RATE: 64 BPM | BODY MASS INDEX: 30.64 KG/M2 | HEIGHT: 59 IN

## 2025-01-16 DIAGNOSIS — Z12.31 ENCOUNTER FOR SCREENING MAMMOGRAM FOR MALIGNANT NEOPLASM OF BREAST: ICD-10-CM

## 2025-01-16 DIAGNOSIS — Z00.00 ROUTINE GENERAL MEDICAL EXAMINATION AT A HEALTH CARE FACILITY: ICD-10-CM

## 2025-01-16 DIAGNOSIS — Z00.00 ROUTINE GENERAL MEDICAL EXAMINATION AT A HEALTH CARE FACILITY: Primary | ICD-10-CM

## 2025-01-16 DIAGNOSIS — Z82.49 FAMILY HISTORY OF EARLY CAD: ICD-10-CM

## 2025-01-16 DIAGNOSIS — Z12.12 SCREENING FOR COLORECTAL CANCER: ICD-10-CM

## 2025-01-16 DIAGNOSIS — Z23 NEED FOR INFLUENZA VACCINATION: ICD-10-CM

## 2025-01-16 DIAGNOSIS — Z12.11 SCREENING FOR COLORECTAL CANCER: ICD-10-CM

## 2025-01-16 LAB
25(OH)D3 SERPL-MCNC: 69 NG/ML (ref 30–100)
ALBUMIN SERPL BCP-MCNC: 4.5 G/DL (ref 3.4–5)
ALP SERPL-CCNC: 108 U/L (ref 33–136)
ALT SERPL W P-5'-P-CCNC: 16 U/L (ref 7–45)
ANION GAP SERPL CALC-SCNC: 14 MMOL/L (ref 10–20)
AST SERPL W P-5'-P-CCNC: 17 U/L (ref 9–39)
BASOPHILS # BLD AUTO: 0.08 X10*3/UL (ref 0–0.1)
BASOPHILS NFR BLD AUTO: 0.9 %
BILIRUB SERPL-MCNC: 0.4 MG/DL (ref 0–1.2)
BUN SERPL-MCNC: 17 MG/DL (ref 6–23)
CALCIUM SERPL-MCNC: 10.4 MG/DL (ref 8.6–10.6)
CHLORIDE SERPL-SCNC: 106 MMOL/L (ref 98–107)
CHOLEST SERPL-MCNC: 178 MG/DL (ref 0–199)
CHOLESTEROL/HDL RATIO: 3.1
CK SERPL-CCNC: 36 U/L (ref 0–215)
CO2 SERPL-SCNC: 26 MMOL/L (ref 21–32)
CREAT SERPL-MCNC: 0.69 MG/DL (ref 0.5–1.05)
CREAT UR-MCNC: 115.5 MG/DL (ref 20–320)
EGFRCR SERPLBLD CKD-EPI 2021: >90 ML/MIN/1.73M*2
EOSINOPHIL # BLD AUTO: 0.14 X10*3/UL (ref 0–0.7)
EOSINOPHIL NFR BLD AUTO: 1.7 %
ERYTHROCYTE [DISTWIDTH] IN BLOOD BY AUTOMATED COUNT: 12.9 % (ref 11.5–14.5)
ERYTHROCYTE [SEDIMENTATION RATE] IN BLOOD BY WESTERGREN METHOD: 10 MM/H (ref 0–30)
EST. AVERAGE GLUCOSE BLD GHB EST-MCNC: 114 MG/DL
GLUCOSE SERPL-MCNC: 96 MG/DL (ref 74–99)
HBA1C MFR BLD: 5.6 %
HCT VFR BLD AUTO: 42.8 % (ref 36–46)
HDLC SERPL-MCNC: 56.9 MG/DL
HGB BLD-MCNC: 13.8 G/DL (ref 12–16)
IMM GRANULOCYTES # BLD AUTO: 0.01 X10*3/UL (ref 0–0.7)
IMM GRANULOCYTES NFR BLD AUTO: 0.1 % (ref 0–0.9)
LDLC SERPL CALC-MCNC: 109 MG/DL
LYMPHOCYTES # BLD AUTO: 2.5 X10*3/UL (ref 1.2–4.8)
LYMPHOCYTES NFR BLD AUTO: 29.6 %
MAGNESIUM SERPL-MCNC: 2 MG/DL (ref 1.6–2.4)
MCH RBC QN AUTO: 28.5 PG (ref 26–34)
MCHC RBC AUTO-ENTMCNC: 32.2 G/DL (ref 32–36)
MCV RBC AUTO: 88 FL (ref 80–100)
MICROALBUMIN UR-MCNC: 21.7 MG/L
MICROALBUMIN/CREAT UR: 18.8 UG/MG CREAT
MONOCYTES # BLD AUTO: 0.47 X10*3/UL (ref 0.1–1)
MONOCYTES NFR BLD AUTO: 5.6 %
NEUTROPHILS # BLD AUTO: 5.24 X10*3/UL (ref 1.2–7.7)
NEUTROPHILS NFR BLD AUTO: 62.1 %
NON HDL CHOLESTEROL: 121 MG/DL (ref 0–149)
NRBC BLD-RTO: 0 /100 WBCS (ref 0–0)
PLATELET # BLD AUTO: 295 X10*3/UL (ref 150–450)
POTASSIUM SERPL-SCNC: 4.4 MMOL/L (ref 3.5–5.3)
PROT SERPL-MCNC: 7.2 G/DL (ref 6.4–8.2)
RBC # BLD AUTO: 4.84 X10*6/UL (ref 4–5.2)
SODIUM SERPL-SCNC: 142 MMOL/L (ref 136–145)
TRIGL SERPL-MCNC: 61 MG/DL (ref 0–149)
TSH SERPL-ACNC: 0.55 MIU/L (ref 0.44–3.98)
VLDL: 12 MG/DL (ref 0–40)
WBC # BLD AUTO: 8.4 X10*3/UL (ref 4.4–11.3)

## 2025-01-16 PROCEDURE — 1160F RVW MEDS BY RX/DR IN RCRD: CPT | Performed by: INTERNAL MEDICINE

## 2025-01-16 PROCEDURE — 1159F MED LIST DOCD IN RCRD: CPT | Performed by: INTERNAL MEDICINE

## 2025-01-16 PROCEDURE — 90662 IIV NO PRSV INCREASED AG IM: CPT | Performed by: INTERNAL MEDICINE

## 2025-01-16 PROCEDURE — 82550 ASSAY OF CK (CPK): CPT

## 2025-01-16 PROCEDURE — 83036 HEMOGLOBIN GLYCOSYLATED A1C: CPT

## 2025-01-16 PROCEDURE — 80053 COMPREHEN METABOLIC PANEL: CPT

## 2025-01-16 PROCEDURE — 80061 LIPID PANEL: CPT

## 2025-01-16 PROCEDURE — 82043 UR ALBUMIN QUANTITATIVE: CPT

## 2025-01-16 PROCEDURE — 82570 ASSAY OF URINE CREATININE: CPT

## 2025-01-16 PROCEDURE — 83735 ASSAY OF MAGNESIUM: CPT

## 2025-01-16 PROCEDURE — 82306 VITAMIN D 25 HYDROXY: CPT

## 2025-01-16 PROCEDURE — 93000 ELECTROCARDIOGRAM COMPLETE: CPT | Performed by: INTERNAL MEDICINE

## 2025-01-16 PROCEDURE — 3008F BODY MASS INDEX DOCD: CPT | Performed by: INTERNAL MEDICINE

## 2025-01-16 PROCEDURE — 90471 IMMUNIZATION ADMIN: CPT | Performed by: INTERNAL MEDICINE

## 2025-01-16 PROCEDURE — 85025 COMPLETE CBC W/AUTO DIFF WBC: CPT

## 2025-01-16 PROCEDURE — 99397 PER PM REEVAL EST PAT 65+ YR: CPT | Performed by: INTERNAL MEDICINE

## 2025-01-16 PROCEDURE — 84443 ASSAY THYROID STIM HORMONE: CPT

## 2025-01-16 PROCEDURE — 85652 RBC SED RATE AUTOMATED: CPT

## 2025-01-16 PROCEDURE — 1036F TOBACCO NON-USER: CPT | Performed by: INTERNAL MEDICINE

## 2025-01-16 ASSESSMENT — ENCOUNTER SYMPTOMS
COLOR CHANGE: 0
SEIZURES: 0
BRUISES/BLEEDS EASILY: 0
DECREASED CONCENTRATION: 0
LIGHT-HEADEDNESS: 0
FREQUENCY: 0
COUGH: 0
SINUS PRESSURE: 0
HEMATURIA: 0
MYALGIAS: 1
VOICE CHANGE: 0
ABDOMINAL PAIN: 0
APPETITE CHANGE: 0
NECK STIFFNESS: 0
ABDOMINAL DISTENTION: 0
SLEEP DISTURBANCE: 0
DYSURIA: 0
CHILLS: 0
DIZZINESS: 0
HEADACHES: 0
SINUS PAIN: 0
BACK PAIN: 0
EYE DISCHARGE: 0
UNEXPECTED WEIGHT CHANGE: 1
RHINORRHEA: 0
PALPITATIONS: 0
HYPERACTIVE: 0
ARTHRALGIAS: 1
FATIGUE: 0
CONSTIPATION: 0
FEVER: 0
ADENOPATHY: 0
WHEEZING: 0
DYSPHORIC MOOD: 0
DIARRHEA: 0
NECK PAIN: 0
DIFFICULTY URINATING: 0
SHORTNESS OF BREATH: 0
ACTIVITY CHANGE: 0
NERVOUS/ANXIOUS: 0
SORE THROAT: 0
VOMITING: 0
WEAKNESS: 0
NAUSEA: 0
EYE ITCHING: 0
CHEST TIGHTNESS: 0

## 2025-01-16 NOTE — PROGRESS NOTES
Thais Huff comes in today for a comprehensive physical exam.      Ms. Huff comes in today for a comprehensive physical exam.  She is starting to feel a bit older, achy, and is not great.  She has been gaining weight.  She gets dyspneic with exertion.  She has generalized aching throughout her legs, sometimes thighs, sometimes in her calves.  She states that this even awakens her at night.  She still is working.  She does try to drink water but admits that it is probably not enough.  She denies any headaches, dizziness, chest pain or palpitations, shortness of breath nor cough, nausea, vomiting, nor acute changes in bowel or bladder habits.  She never contacted us back regarding the osteoporosis that was seen on her last DEXA.  It was recommended that she start on medication therapy.  She is having some significant dental work coming up in February, so she would like to defer until after that time.  She is taking vitamin D and would like to increase calcium supplementation.  She is due for mammogram this spring, she follows with gynecology.  Colonoscopy is due later this year.  She is amenable to updating comprehensive lab studies.  She does follow with her endocrinologist and also sees ophthalmology regularly.        Review of Systems   Constitutional:  Positive for unexpected weight change (Progressive weight gain). Negative for activity change, appetite change, chills, fatigue and fever.   HENT:  Negative for congestion, ear pain, postnasal drip, rhinorrhea, sinus pressure, sinus pain, sneezing, sore throat, tinnitus and voice change.    Eyes:  Negative for discharge, itching and visual disturbance.   Respiratory:  Negative for cough, chest tightness, shortness of breath (Some dyspnea with exertion) and wheezing.    Cardiovascular:  Negative for chest pain, palpitations and leg swelling.   Gastrointestinal:  Negative for abdominal distention, abdominal pain, constipation, diarrhea, nausea and vomiting.    Endocrine: Negative for cold intolerance, heat intolerance and polyuria.   Genitourinary:  Negative for difficulty urinating, dysuria, frequency, hematuria, urgency, vaginal bleeding and vaginal discharge.   Musculoskeletal:  Positive for arthralgias and myalgias. Negative for back pain, gait problem, neck pain and neck stiffness.   Skin:  Negative for color change, pallor and rash.   Allergic/Immunologic: Negative for environmental allergies, food allergies and immunocompromised state.   Neurological:  Negative for dizziness, seizures, syncope, weakness, light-headedness and headaches.   Hematological:  Negative for adenopathy. Does not bruise/bleed easily.   Psychiatric/Behavioral:  Negative for behavioral problems, decreased concentration, dysphoric mood and sleep disturbance. The patient is not nervous/anxious and is not hyperactive.        Objective   Physical Exam  Constitutional:       General: She is not in acute distress.     Appearance: Normal appearance. She is well-developed. She is not ill-appearing.   HENT:      Head: Normocephalic.      Right Ear: Tympanic membrane, ear canal and external ear normal.      Left Ear: Tympanic membrane, ear canal and external ear normal.      Nose: Nose normal. No congestion.      Mouth/Throat:      Mouth: Mucous membranes are moist.      Pharynx: Oropharynx is clear. No oropharyngeal exudate or posterior oropharyngeal erythema.   Eyes:      General: Lids are normal. No scleral icterus.     Extraocular Movements: Extraocular movements intact.      Conjunctiva/sclera: Conjunctivae normal.      Pupils: Pupils are equal, round, and reactive to light.   Neck:      Vascular: No carotid bruit.   Cardiovascular:      Rate and Rhythm: Normal rate and regular rhythm.      Pulses: Normal pulses.      Heart sounds: No murmur heard.     No gallop.   Pulmonary:      Effort: Pulmonary effort is normal. No respiratory distress.      Breath sounds: No wheezing, rhonchi or rales.    Chest:   Breasts:     Right: No mass.      Left: No mass.   Abdominal:      General: Bowel sounds are normal. There is no distension.      Palpations: Abdomen is soft. There is no mass.      Tenderness: There is no abdominal tenderness. There is no guarding or rebound.   Genitourinary:     Comments: Deferred to gynecology  Musculoskeletal:         General: No swelling or signs of injury.      Cervical back: Normal range of motion and neck supple. No tenderness.      Right lower leg: No edema.      Left lower leg: No edema.   Lymphadenopathy:      Cervical: No cervical adenopathy.      Upper Body:      Right upper body: No supraclavicular or axillary adenopathy.      Left upper body: No supraclavicular or axillary adenopathy.   Skin:     General: Skin is warm and dry.      Coloration: Skin is not pale.      Findings: No bruising or rash.   Neurological:      General: No focal deficit present.      Mental Status: She is alert and oriented to person, place, and time.      Cranial Nerves: No cranial nerve deficit.      Motor: No weakness.      Coordination: Coordination normal.      Gait: Gait normal.   Psychiatric:         Mood and Affect: Mood normal.         Behavior: Behavior normal.         Thought Content: Thought content normal.         Judgment: Judgment normal.         Assessment/Plan   Full age-appropriate comprehensive physical exam and health care maintenance performed and discussed today.  Routine safety and preventative measures discussed including self breast exam, seatbelt use, no drinking and driving, no texting and driving, abstinence or cessation of tobacco use, routine dental and vision exams, healthy diet and regular exercise.    We will update comprehensive labs and follow-up on results once available.  Mammogram will be due in March, order placed.  EKG as above.  Will try to get cardiac calcium scoring scan approved based on strong family history CAD.  DEXA updated in 2024, results below, plan  on repeat 2 years.  Colonoscopy will be due later this year, order placed.  Flu shot provided today.  Has completed pneumonia vaccine recommendations.  Have counseled regarding tetanus vaccine, shingles series, and updated COVID boosters.  Low risk for RSV at this time.    Have recommended bisphosphonate therapy for osteoporosis.  She will consider after her dental procedures later in the spring.  Recommend calcium and vitamin D as well as hydrating fluids.  Update sed rate, magnesium and CPK in addition to her labs based on her myalgias.  Happy to see her back annually, she is to contact us with any questions or change in symptoms.    Problem List Items Addressed This Visit    None

## 2025-01-16 NOTE — PATIENT INSTRUCTIONS
It was a pleasure seeing you in the office today.  We will follow up on all comprehensive blood work once results are available and make any recommendations based on these results as may be indicated.  Please continue with routine gynecologic exams and annual mammograms.  An order for cardiac screening has been placed, you can schedule at your convenience.  Your colonoscopy will be due later this year as well, someone should be contacting you shortly to help you schedule.  Thank you for receiving your flu shot.  Please consider an updated tetanus vaccine, COVID booster, and shingles vaccine series through your local pharmacy.  I would consider starting on medication to help stabilize osteoporosis.  This is typically taken once weekly.  Please let us know after your dental procedures if you are comfortable starting on this and we would be happy to call this to your pharmacy.  Please remember to stay hydrated.  We will make any additional recommendations based on your lab studies.  Please keep close follow-up with your specialists.  If you have any questions, reach out anytime.  Otherwise, we are happy to see you annually for your wellness visits.

## 2025-02-04 ENCOUNTER — APPOINTMENT (OUTPATIENT)
Dept: ENDOCRINOLOGY | Facility: CLINIC | Age: 65
End: 2025-02-04
Payer: COMMERCIAL

## 2025-02-04 VITALS
RESPIRATION RATE: 16 BRPM | HEART RATE: 76 BPM | BODY MASS INDEX: 31.69 KG/M2 | HEIGHT: 59 IN | WEIGHT: 157.2 LBS | DIASTOLIC BLOOD PRESSURE: 70 MMHG | SYSTOLIC BLOOD PRESSURE: 128 MMHG

## 2025-02-04 DIAGNOSIS — E03.9 HYPOTHYROIDISM, UNSPECIFIED TYPE: Primary | ICD-10-CM

## 2025-02-04 PROCEDURE — 1159F MED LIST DOCD IN RCRD: CPT | Performed by: INTERNAL MEDICINE

## 2025-02-04 PROCEDURE — 99213 OFFICE O/P EST LOW 20 MIN: CPT | Performed by: INTERNAL MEDICINE

## 2025-02-04 PROCEDURE — 3008F BODY MASS INDEX DOCD: CPT | Performed by: INTERNAL MEDICINE

## 2025-02-04 PROCEDURE — 1036F TOBACCO NON-USER: CPT | Performed by: INTERNAL MEDICINE

## 2025-02-04 PROCEDURE — 1160F RVW MEDS BY RX/DR IN RCRD: CPT | Performed by: INTERNAL MEDICINE

## 2025-02-04 RX ORDER — LEVOTHYROXINE SODIUM 137 UG/1
137 TABLET ORAL DAILY
Qty: 90 TABLET | Refills: 3 | Status: SHIPPED | OUTPATIENT
Start: 2025-02-04 | End: 2026-02-04

## 2025-02-04 ASSESSMENT — ENCOUNTER SYMPTOMS
NAUSEA: 0
FEVER: 0
FATIGUE: 0
HEADACHES: 0
COUGH: 0
DIARRHEA: 0
CHILLS: 0
PALPITATIONS: 0
VOMITING: 0
SHORTNESS OF BREATH: 0

## 2025-02-04 NOTE — PROGRESS NOTES
Endocrinology: Follow up visit  Subjective   Patient ID: Thais Huff is a 64 y.o. female who presents for Hypothyroidism.    PCP: Divya Barrios MD    HPI  Here for yearly follow up hypothyroidism  Last seen summer 2023.   Adjusted several times in late 2023 and stable since  Currently on 137 mcg  Feels tired but not sleeping but to bilateral migrating leg pain.    Recently seen by pcp    Review of Systems   Constitutional:  Negative for chills, fatigue and fever.   Respiratory:  Negative for cough and shortness of breath.    Cardiovascular:  Negative for chest pain and palpitations.   Gastrointestinal:  Negative for diarrhea, nausea and vomiting.   Neurological:  Negative for headaches.       Patient Active Problem List   Diagnosis    Abnormal antibody titer    Age related osteoporosis    Bilateral myopia    Bilateral presbyopia    Cataracts, both eyes    Hypothyroidism    Irritable bowel syndrome    Nuclear sclerosis of both eyes    Obesity    Ophthalmic migraine    Osteoarthritis, multiple sites    PVD (posterior vitreous detachment), both eyes    Refractive error    Vitamin D deficiency        Home Meds:  Current Outpatient Medications   Medication Instructions    calcium carbonate-vit D3-min 600 mg calcium- 400 unit tablet Take by mouth.    cholecalciferol (Vitamin D-3) 5,000 Units tablet 1 tablet, Daily    Intrarosa 6.5 mg, vaginal, Nightly    levothyroxine (SYNTHROID, LEVOXYL) 137 mcg, oral, Daily    meclizine (Antivert) 25 mg tablet 1 tablet, 3 times daily PRN        Allergies   Allergen Reactions    Iodinated Contrast Media Hives and Itching        Objective   Vitals:    02/04/25 1551   BP: 128/70   Pulse: 76   Resp: 16      Vitals:    02/04/25 1551   Weight: 71.3 kg (157 lb 3.2 oz)      Body mass index is 31.75 kg/m².   Physical Exam  Constitutional:       Appearance: Normal appearance. She is overweight.   HENT:      Head: Normocephalic and atraumatic.   Neck:      Thyroid: No thyroid mass, thyromegaly or  thyroid tenderness.   Cardiovascular:      Rate and Rhythm: Normal rate and regular rhythm.      Heart sounds: No murmur heard.     No gallop.   Pulmonary:      Effort: Pulmonary effort is normal.      Breath sounds: Normal breath sounds.   Abdominal:      Palpations: Abdomen is soft.      Comments: benign   Neurological:      General: No focal deficit present.      Mental Status: She is alert and oriented to person, place, and time.      Deep Tendon Reflexes: Reflexes are normal and symmetric.   Psychiatric:         Behavior: Behavior is cooperative.         Labs:  Lab Results   Component Value Date    HGBA1C 5.6 01/16/2025    TSH 0.55 01/16/2025    FREET4 1.69 (H) 10/03/2023      Lab Results   Component Value Date    THYROIDPAB >1000 (A) 09/10/2022    TSI <1.0 09/10/2022        Assessment/Plan   Assessment & Plan  Hypothyroidism, unspecified type    Recent tsh normal.  Continue current thyroid med dose  Follow up in one year  See pcp back regarding legs, may need back eval      Electronically signed by:  Teresa De Oliveira MD 02/04/25 3:51 PM

## 2025-02-04 NOTE — ASSESSMENT & PLAN NOTE
Recent tsh normal.  Continue current thyroid med dose  Follow up in one year  See pcp back regarding legs, may need back eval

## 2025-03-02 DIAGNOSIS — E03.9 HYPOTHYROIDISM, UNSPECIFIED TYPE: Primary | ICD-10-CM

## 2025-03-02 RX ORDER — LEVOTHYROXINE SODIUM 137 UG/1
137 TABLET ORAL DAILY
Qty: 90 TABLET | Refills: 3 | Status: SHIPPED | OUTPATIENT
Start: 2025-03-02 | End: 2026-03-02

## 2025-05-30 ENCOUNTER — OFFICE VISIT (OUTPATIENT)
Dept: PRIMARY CARE | Facility: CLINIC | Age: 65
End: 2025-05-30
Payer: COMMERCIAL

## 2025-05-30 VITALS
DIASTOLIC BLOOD PRESSURE: 75 MMHG | HEIGHT: 59 IN | HEART RATE: 70 BPM | BODY MASS INDEX: 31.45 KG/M2 | SYSTOLIC BLOOD PRESSURE: 119 MMHG | OXYGEN SATURATION: 94 % | WEIGHT: 156 LBS

## 2025-05-30 DIAGNOSIS — M81.0 AGE-RELATED OSTEOPOROSIS WITHOUT CURRENT PATHOLOGICAL FRACTURE: ICD-10-CM

## 2025-05-30 DIAGNOSIS — M79.601 PAIN IN BOTH UPPER EXTREMITIES: Primary | ICD-10-CM

## 2025-05-30 DIAGNOSIS — M79.651 PAIN IN BOTH THIGHS: ICD-10-CM

## 2025-05-30 DIAGNOSIS — M79.652 PAIN IN BOTH THIGHS: ICD-10-CM

## 2025-05-30 DIAGNOSIS — M79.602 PAIN IN BOTH UPPER EXTREMITIES: Primary | ICD-10-CM

## 2025-05-30 PROCEDURE — 3008F BODY MASS INDEX DOCD: CPT

## 2025-05-30 PROCEDURE — 1159F MED LIST DOCD IN RCRD: CPT

## 2025-05-30 PROCEDURE — 99213 OFFICE O/P EST LOW 20 MIN: CPT

## 2025-05-30 PROCEDURE — 1125F AMNT PAIN NOTED PAIN PRSNT: CPT

## 2025-05-30 ASSESSMENT — ENCOUNTER SYMPTOMS
ARTHRALGIAS: 1
CHEST TIGHTNESS: 0
NERVOUS/ANXIOUS: 0
NAUSEA: 0
CONFUSION: 0
NECK PAIN: 0
FLANK PAIN: 0
CONSTIPATION: 0
LOSS OF SENSATION IN FEET: 0
SEIZURES: 0
DEPRESSION: 0
PALPITATIONS: 0
OCCASIONAL FEELINGS OF UNSTEADINESS: 0
DIZZINESS: 0
FEVER: 0
DIFFICULTY URINATING: 0
NECK STIFFNESS: 0
WHEEZING: 0
DIARRHEA: 1
NUMBNESS: 0
VOMITING: 0
HEADACHES: 0
MYALGIAS: 1
WEAKNESS: 0
COUGH: 0
FATIGUE: 0
LIGHT-HEADEDNESS: 0
BACK PAIN: 0
HEMATURIA: 0
CHILLS: 0
SHORTNESS OF BREATH: 0
TREMORS: 0
ABDOMINAL PAIN: 0

## 2025-05-30 ASSESSMENT — PATIENT HEALTH QUESTIONNAIRE - PHQ9
SUM OF ALL RESPONSES TO PHQ9 QUESTIONS 1 AND 2: 0
2. FEELING DOWN, DEPRESSED OR HOPELESS: NOT AT ALL
1. LITTLE INTEREST OR PLEASURE IN DOING THINGS: NOT AT ALL

## 2025-05-30 ASSESSMENT — PAIN SCALES - GENERAL: PAINLEVEL_OUTOF10: 8

## 2025-05-30 NOTE — PROGRESS NOTES
Subjective   Patient ID: Thais Huff is a 64 y.o. female who presents for Arm Pain.    HPI   Patient is a 63 yo female with past medical history significant for hypothyroidism who presents today for bilateral upper arm pain and bilateral thigh pain. Denies any injury. States she has had arm pain for weeks. It is worsened by certain movements such as closing the negron of her car. It is also worse in the morning when she wakes up. Patient states she has not really tried anything to help with the pain. Has tried tylenol once with relief. States it comes and goes and feels achy. Rates it a 6. She states that the pain in her legs is only in her thighs. Denies any hip pain or back pain. States that her legs feel heavy when she walks. States it comes and goes. Tries to maintain a good calcium and vitamin d intake. Patient denies any numbness or tingling in any extremities. Denies any loss of sensation anywhere.  Denies chest pain, SOB, dizziness and headaches.   -Has been seen in the past for similar concerns. Had x-ray of right shoulder and left hip completed, which were reassuring.   -Osteoporosis- DEXA 1/24 - showed osteoporosis. Medications were recommended at that time. Patient states she still would not like to go on anything at this time and would like to continue taking her supplements. Tries to maintain an adequate calcium and vitamin d intake. No recent falls.     Review of Systems   Constitutional:  Negative for chills, fatigue and fever.   HENT:  Negative for congestion, ear pain and postnasal drip.    Respiratory:  Negative for cough, chest tightness, shortness of breath and wheezing.    Cardiovascular:  Negative for chest pain, palpitations and leg swelling.   Gastrointestinal:  Positive for diarrhea. Negative for abdominal pain, constipation, nausea and vomiting.        Hx IBS-D   Endocrine: Negative for cold intolerance and heat intolerance.   Genitourinary:  Negative for difficulty urinating, flank pain  "and hematuria.   Musculoskeletal:  Positive for arthralgias and myalgias. Negative for back pain, gait problem, neck pain and neck stiffness.        Pain in upper arms and thighs   Skin:  Negative for rash.   Neurological:  Negative for dizziness, tremors, seizures, syncope, weakness, light-headedness, numbness and headaches.   Psychiatric/Behavioral:  Negative for confusion. The patient is not nervous/anxious.        Objective   /75 (BP Location: Left arm, Patient Position: Sitting, BP Cuff Size: Adult)   Pulse 70   Ht 1.499 m (4' 11\")   Wt 70.8 kg (156 lb)   SpO2 94%   BMI 31.51 kg/m²     Physical Exam  Vitals and nursing note reviewed.   Constitutional:       Appearance: Normal appearance.   HENT:      Head: Normocephalic and atraumatic.   Cardiovascular:      Rate and Rhythm: Normal rate and regular rhythm.      Pulses: Normal pulses.      Heart sounds: Normal heart sounds.   Pulmonary:      Effort: Pulmonary effort is normal.      Breath sounds: Normal breath sounds.   Musculoskeletal:         General: No swelling, tenderness or deformity. Normal range of motion.      Cervical back: Normal range of motion. No rigidity or tenderness.      Comments: Full ROM of upper and lower extremities  No pain with palpation or movements    Skin:     General: Skin is warm and dry.      Findings: No lesion or rash.   Neurological:      General: No focal deficit present.      Mental Status: She is alert and oriented to person, place, and time. Mental status is at baseline.   Psychiatric:         Mood and Affect: Mood normal.         Thought Content: Thought content normal.         Judgment: Judgment normal.         Assessment/Plan   Assessment & Plan  Pain in both upper extremities  -Referral to physical therapy sent.  -Patient states she would like to try physical therapy.  -Go to ER if any change or worsening of symptoms, loss of sensation in extremities, numbness or tingling.   -Patient can continue with tylenol " if providing relief.  -Patient to follow up on how she is doing and how PT is going  Orders:    Referral to Physical Therapy; Future  -Can discuss other treatment options if symptoms persist.  Pain in both thighs  -Referral to physical therapy.        Age-related osteoporosis without current pathological fracture  -Patient states she would not like to begin any type of medication right now. Would like to continue with her supplements.  -Educated on importance of adequate vitamin d and calcium intake.

## 2025-05-30 NOTE — ASSESSMENT & PLAN NOTE
-Patient states she would not like to begin any type of medication right now. Would like to continue with her supplements.  -Educated on importance of adequate vitamin d and calcium intake.

## 2025-06-04 ENCOUNTER — HOSPITAL ENCOUNTER (OUTPATIENT)
Dept: RADIOLOGY | Facility: CLINIC | Age: 65
Discharge: HOME | End: 2025-06-04
Payer: COMMERCIAL

## 2025-06-04 VITALS — BODY MASS INDEX: 31.45 KG/M2 | WEIGHT: 156 LBS | HEIGHT: 59 IN

## 2025-06-04 DIAGNOSIS — Z12.31 ENCOUNTER FOR SCREENING MAMMOGRAM FOR MALIGNANT NEOPLASM OF BREAST: ICD-10-CM

## 2025-06-04 PROCEDURE — 77063 BREAST TOMOSYNTHESIS BI: CPT | Performed by: RADIOLOGY

## 2025-06-04 PROCEDURE — 77067 SCR MAMMO BI INCL CAD: CPT | Performed by: RADIOLOGY

## 2025-06-04 PROCEDURE — 77067 SCR MAMMO BI INCL CAD: CPT

## 2025-06-12 ENCOUNTER — EVALUATION (OUTPATIENT)
Dept: PHYSICAL THERAPY | Facility: CLINIC | Age: 65
End: 2025-06-12
Payer: COMMERCIAL

## 2025-06-12 DIAGNOSIS — R53.1 GENERALIZED WEAKNESS: Primary | ICD-10-CM

## 2025-06-12 DIAGNOSIS — M79.602 PAIN IN BOTH UPPER EXTREMITIES: ICD-10-CM

## 2025-06-12 DIAGNOSIS — M79.601 PAIN IN BOTH UPPER EXTREMITIES: ICD-10-CM

## 2025-06-12 PROCEDURE — 97110 THERAPEUTIC EXERCISES: CPT | Mod: GP

## 2025-06-12 PROCEDURE — 97161 PT EVAL LOW COMPLEX 20 MIN: CPT | Mod: GP

## 2025-06-12 NOTE — PROGRESS NOTES
Physical Therapy    Physical Therapy Evaluation and Treatment    Patient Name: Thais Huff  MRN: [id number]  Encounter Date: 6/12/2025     Time Calculation  Start Time: 1400  Stop Time: 1440  Time Calculation (min): 40 min    Current Problem:   1. Generalized weakness  Follow Up In Physical Therapy      2. Pain in both upper extremities  Referral to Physical Therapy    Follow Up In Physical Therapy          STEADI Fall Risk: 0 (score of 4+ indicates fall risk)       SUBJECTIVE:   The patient is a 64-year-old female with a history of hypothyroidism and recently diagnosed osteoporosis (per DEXA, 1/24), who presents with bilateral upper arm and thigh pain. She reports a gradual onset of symptoms over the past several weeks, without a specific injury. Arm pain is described as achy, intermittent, and rated 6/10 in intensity. It worsens with certain movements such as reaching to close the negron of her car and is more pronounced in the morning. She has tried Tylenol once with some relief but has not pursued any other interventions.    The patient also reports bilateral thigh discomfort, described as heaviness when walking, though she denies any associated hip or back pain. She denies numbness, tingling, or sensory loss in any extremities.She has had similar symptoms in the past and prior imaging of the right shoulder and left hip was unremarkable. She is currently not on pharmacologic        Pain:   Current: 2-3/10    Outcome Measures: 33/80       OBJECTIVE:    Objective:  Posture: Slight forward head and rounded shoulder posture noted    AROM:  Shoulder: Limited and painful overhead reaching bilaterally  Hip: Functional range noted with mild fatigue reported with repeated sit-to-stands    Strength:  Bilateral shoulders: 4-/5 with flexion and abduction  Bilateral hips: 4-/5 with flexion and extension  Palpation: Mild tenderness over bilateral deltoids and quadriceps  Gait: Independent ambulation with slight decrease in  step length, reports heaviness in legs during prolonged walking  Balance: WNL for static standing, mild instability noted during dynamic balance tasks    Treatments:  Therapeutic Exercise: (10 minutes)   2# weight  Lateral raises   Frontal raises  90-90 lateral raises  Shoulder presses  Biceps curls    ASSESSMENT:   Patient presents with bilateral upper arm and thigh pain likely due to generalized deconditioning, compounded by her underlying osteoporosis and limited activity level. Her symptoms are mechanical in nature, with aggravation during specific movements and morning stiffness suggestive of musculoskeletal involvement. No signs of neurologic involvement or red flag symptoms. She would benefit from a comprehensive physical therapy program focusing on general strengthening, posture correction, flexibility, and gradual aerobic conditioning to reduce symptoms and prevent further deconditioning.      Encouraged patient to look into getting into a formal exercise routine with the equipment she stated she has at home. We reviewed things that she can do independently. Pt will report back in a few weeks   Complexity of Evaluation: low   Based on the history including personal factors and/or comorbidities, examination of body systems including body structures and function, activity limitations, and/or participation restrictions, as well as clinical presentation, patient meets criteria for a low  complexity evaluation.     PLAN:  OP PT PLAN:  Treatment/Interventions: Education/Instruction , Electrical Stimulation , Hot Pack , Manual Therapy  , Neuromuscular re-education , Therapeutic activities , and Therapeutic exercise    PT Plan: Skilled PT   PT Frequency: 1 time per week   Duration: 4 weeks   Insurance: [employer] - [employer]YLA AUTH / $600 Deduct not met / 90% Coins / $1400 OOP not met / [place] VISITS / Per Benefits fax / ds 6/11/25 // In Network per naida Arciniega # [id number]. No auth, Ref: Cherri NARVAEZ 6/12/25 913 am EST.    Visits Approved: [place]  Certification Period Start Date: 06/12  Certification Period End Date: 09/12  Rehab Potential: Good  Plan of Care Agreement: Patient         Goals:   Long-Term Goals (6-8 weeks):    Independent HEP compliance for strength and mobility    Pain-free functional ROM of shoulders and hips    Tolerate 20+ minutes of continuous walking without symptoms

## 2025-07-14 ENCOUNTER — TREATMENT (OUTPATIENT)
Dept: PHYSICAL THERAPY | Facility: CLINIC | Age: 65
End: 2025-07-14
Payer: COMMERCIAL

## 2025-07-14 DIAGNOSIS — R53.1 GENERALIZED WEAKNESS: ICD-10-CM

## 2025-07-14 DIAGNOSIS — M79.601 PAIN IN BOTH UPPER EXTREMITIES: ICD-10-CM

## 2025-07-14 DIAGNOSIS — M79.602 PAIN IN BOTH UPPER EXTREMITIES: ICD-10-CM

## 2025-07-14 PROCEDURE — 97110 THERAPEUTIC EXERCISES: CPT | Mod: GP

## 2025-07-16 NOTE — PROGRESS NOTES
Physical Therapy    Physical Therapy Treatment    Patient Name: Thais Huff  MRN: [id number]  Encounter Date: 7/14/2025     Time Calculation  Start Time: 1615  Stop Time: 1655  Time Calculation (min): 40 min    Visit #: 2    Insurance:   [employer]INO[employer]ANC[employer] - NO AUTH / $600 Deduct not met / 90% Coins / $1400 OOP not met / [place] VISITS / Per Benefits fax / ds 6/11/25 // In Network per Suze, audit # [id number]. No auth, Ref: Cherri SOLANGE 6/12/25 913 am EST.   Evaluation date: 06/12      Current Problem:   1. Pain in both upper extremities  Follow Up In Physical Therapy      2. Generalized weakness  Follow Up In Physical Therapy          SUBJECTIVE:   Pt reports exercises have not been helping much      Precautions: none        Pain:   Start of session: 2/10       OBJECTIVE:    Weakness particularly with L Abd, ER  IR Lack of ROM       Treatments:  Therapeutic Exercise: (40 minutes)  3 x10   Green tband:  ER walkouts  ER   Straight arm pull down  Rows    Bicep curls  Front raises 3#   Lateral raises 3#        HEP:  See above    ASSESSMENT:   Emphasis placed on keeping shoulder exercises below head level. Pt tolerated well, will cont to benefit from additional skilled PT    Post session pain: 2     PLAN:  Cont to progress general strength         Goals:   Progressing

## 2025-07-30 ENCOUNTER — TREATMENT (OUTPATIENT)
Dept: PHYSICAL THERAPY | Facility: CLINIC | Age: 65
End: 2025-07-30
Payer: COMMERCIAL

## 2025-07-30 DIAGNOSIS — M79.602 PAIN IN BOTH UPPER EXTREMITIES: ICD-10-CM

## 2025-07-30 DIAGNOSIS — R53.1 GENERALIZED WEAKNESS: ICD-10-CM

## 2025-07-30 DIAGNOSIS — M79.601 PAIN IN BOTH UPPER EXTREMITIES: ICD-10-CM

## 2025-07-30 PROCEDURE — 97110 THERAPEUTIC EXERCISES: CPT | Mod: GP

## 2025-07-30 NOTE — PROGRESS NOTES
Physical Therapy    Physical Therapy Treatment    Patient Name: Thais Huff  MRN: [id number]  Encounter Date: 7/30/2025     Time Calculation  Start Time: 0700  Stop Time: 0740  Time Calculation (min): 40 min    Visit #: 3    Insurance:   [employer] - NO AUTH / $600 Deduct not met / 90% Coins / $1400 OOP not met / [place] VISITS / Per Benefits fax / ds 6/11/25 // In Network per Suze, audit # [id number]. No auth, Ref: Cherri SOLANGE 6/12/25 913 am EST.   Evaluation date: 06/12      Current Problem:   1. Pain in both upper extremities  Follow Up In Physical Therapy      2. Generalized weakness  Follow Up In Physical Therapy          SUBJECTIVE:   Pt reports she feels the most stiff in the morning      Precautions: none        Pain:   Start of session: 2/10       OBJECTIVE:    Weakness particularly with L Abd, ER  IR Lack of ROM       Treatments:  Therapeutic Exercise: (25minutes)  Towel slides flexion, scaption  Dowel royce press 3#  Dowel royce OH flexion 3#  Phyisoball roll outs 2 x20  Pulleys x 3 mins  Arm bike x 4 mins  Dowel royce abd 2 x10  Dowel royce flexion 2 x10      Manual:15 mins  PROM shoulder flexion, abd, IR, ER  GH AP/PA, inferior glide grade III        HEP:  See above    ASSESSMENT:   Emphasis placed on improving shoulder mobility followed by some active assist ROM exercises    Post session pain: 2     PLAN:  Cont to progress general strength         Goals:   Progressing

## 2025-08-05 ENCOUNTER — TREATMENT (OUTPATIENT)
Dept: PHYSICAL THERAPY | Facility: CLINIC | Age: 65
End: 2025-08-05
Payer: COMMERCIAL

## 2025-08-05 DIAGNOSIS — R53.1 GENERALIZED WEAKNESS: ICD-10-CM

## 2025-08-05 DIAGNOSIS — M79.602 PAIN IN BOTH UPPER EXTREMITIES: ICD-10-CM

## 2025-08-05 DIAGNOSIS — M79.601 PAIN IN BOTH UPPER EXTREMITIES: ICD-10-CM

## 2025-08-05 PROCEDURE — 97140 MANUAL THERAPY 1/> REGIONS: CPT | Mod: GP

## 2025-08-05 PROCEDURE — 97110 THERAPEUTIC EXERCISES: CPT | Mod: GP

## 2025-08-05 NOTE — PROGRESS NOTES
Physical Therapy    Physical Therapy Treatment    Patient Name: Thais Huff  MRN: [id number]  Encounter Date: 8/5/2025     Time Calculation  Start Time: 0715  Stop Time: 0755  Time Calculation (min): 40 min    Visit #: 4    Insurance:   [employer] - NO AUTH / $600 Deduct not met / 90% Coins / $1400 OOP not met / [place] VISITS / Per Benefits fax / ds 6/11/25 // In Network per Suze, audit # [id number]. No auth, Ref: Cherri NARVAEZ 6/12/25 913 am EST.   Evaluation date: 06/12      Current Problem:   1. Pain in both upper extremities  Follow Up In Physical Therapy      2. Generalized weakness  Follow Up In Physical Therapy          SUBJECTIVE:   Pt reports she feels the most stiff in the morning   Symptoms present in B/L UE all the way down to the hands R>L  Chronic pain in B/L UE, nothing specifically is making it worse.      Precautions: none        Pain:   Start of session: 2/10       OBJECTIVE:    Weakness particularly with L Abd, ER  IR Lack of ROM       Treatments:  Therapeutic Exercise: (25minutes)  Towel slides flexion, scaption  Dowel royce press 3#  Dowel royce OH flexion 3#  Phyisoball roll outs 2 x20  Pulleys x 3 mins  Arm bike x 4 mins  Dowel royce abd 2 x10  Dowel royce flexion 2 x10      Manual:15 mins  PROM shoulder flexion, abd, IR, ER  GH AP/PA, inferior glide grade III   Cervical traction ( manual)       HEP:  See above    ASSESSMENT:   Pt presents for visit 4 today. Overall progress has not been improving she is continuing to notice constant pain, and even some radicular pain. We incorporated some manual cervical traction today as well. Patient is  mostly limited by pain which is chronic in nature and overall not improving.  Given the lack of improvement and the persistence of radicular complaints, I will be contacting Dr. Barrios to discuss the possibility of obtaining further imaging to better assess the underlying pathology and guide future treatment planning.    Post session pain: 2     PLAN:  Cont to progress  general strength         Goals:   Progressing

## 2025-08-10 ENCOUNTER — HOSPITAL ENCOUNTER (OUTPATIENT)
Dept: RADIOLOGY | Facility: HOSPITAL | Age: 65
Discharge: HOME | End: 2025-08-10
Payer: COMMERCIAL

## 2025-08-10 DIAGNOSIS — Z00.00 ROUTINE GENERAL MEDICAL EXAMINATION AT A HEALTH CARE FACILITY: ICD-10-CM

## 2025-08-10 DIAGNOSIS — Z82.49 FAMILY HISTORY OF EARLY CAD: ICD-10-CM

## 2025-08-10 PROCEDURE — 75571 CT HRT W/O DYE W/CA TEST: CPT

## 2025-08-13 ENCOUNTER — APPOINTMENT (OUTPATIENT)
Dept: PHYSICAL THERAPY | Facility: CLINIC | Age: 65
End: 2025-08-13
Payer: COMMERCIAL

## 2025-08-20 ENCOUNTER — APPOINTMENT (OUTPATIENT)
Dept: PHYSICAL THERAPY | Facility: CLINIC | Age: 65
End: 2025-08-20
Payer: COMMERCIAL

## 2025-08-20 ENCOUNTER — APPOINTMENT (OUTPATIENT)
Dept: PRIMARY CARE | Facility: CLINIC | Age: 65
End: 2025-08-20
Payer: COMMERCIAL

## 2025-08-20 ASSESSMENT — PATIENT HEALTH QUESTIONNAIRE - PHQ9
2. FEELING DOWN, DEPRESSED OR HOPELESS: NOT AT ALL
1. LITTLE INTEREST OR PLEASURE IN DOING THINGS: NOT AT ALL
SUM OF ALL RESPONSES TO PHQ9 QUESTIONS 1 AND 2: 0

## 2025-08-20 ASSESSMENT — ENCOUNTER SYMPTOMS
DEPRESSION: 0
LOSS OF SENSATION IN FEET: 0
OCCASIONAL FEELINGS OF UNSTEADINESS: 0

## 2025-08-20 ASSESSMENT — PAIN SCALES - GENERAL: PAINLEVEL_OUTOF10: 6

## 2025-08-31 ASSESSMENT — ENCOUNTER SYMPTOMS
CHILLS: 0
FEVER: 0
UNEXPECTED WEIGHT CHANGE: 0
SINUS PAIN: 0
SINUS PRESSURE: 0
FATIGUE: 0

## 2025-09-02 ASSESSMENT — ENCOUNTER SYMPTOMS
WHEEZING: 0
NUMBNESS: 0
SEIZURES: 0
NAUSEA: 0
DIFFICULTY URINATING: 0
FLANK PAIN: 0
NECK PAIN: 0
COUGH: 0
CONSTIPATION: 0
JOINT SWELLING: 0
HEMATURIA: 0
DIZZINESS: 0
LIGHT-HEADEDNESS: 0
DIARRHEA: 0
VOMITING: 0
ARTHRALGIAS: 1
DYSURIA: 0
BLOOD IN STOOL: 0
HEADACHES: 0
PALPITATIONS: 0
WEAKNESS: 0
ABDOMINAL PAIN: 0
CHEST TIGHTNESS: 0
SHORTNESS OF BREATH: 0
BACK PAIN: 0

## 2025-09-06 ENCOUNTER — APPOINTMENT (OUTPATIENT)
Dept: OPHTHALMOLOGY | Facility: CLINIC | Age: 65
End: 2025-09-06
Payer: COMMERCIAL

## 2025-10-06 ENCOUNTER — APPOINTMENT (OUTPATIENT)
Dept: OPHTHALMOLOGY | Facility: CLINIC | Age: 65
End: 2025-10-06
Payer: COMMERCIAL

## 2025-10-13 ENCOUNTER — APPOINTMENT (OUTPATIENT)
Dept: OPHTHALMOLOGY | Facility: CLINIC | Age: 65
End: 2025-10-13
Payer: COMMERCIAL

## 2025-10-22 ENCOUNTER — APPOINTMENT (OUTPATIENT)
Dept: RHEUMATOLOGY | Facility: CLINIC | Age: 65
End: 2025-10-22
Payer: COMMERCIAL

## 2025-11-07 ENCOUNTER — APPOINTMENT (OUTPATIENT)
Dept: SLEEP MEDICINE | Facility: CLINIC | Age: 65
End: 2025-11-07
Payer: COMMERCIAL

## 2025-11-24 ENCOUNTER — APPOINTMENT (OUTPATIENT)
Dept: OPHTHALMOLOGY | Facility: CLINIC | Age: 65
End: 2025-11-24
Payer: COMMERCIAL

## 2025-12-06 ENCOUNTER — APPOINTMENT (OUTPATIENT)
Dept: OPHTHALMOLOGY | Facility: CLINIC | Age: 65
End: 2025-12-06
Payer: COMMERCIAL